# Patient Record
Sex: FEMALE | Race: WHITE
[De-identification: names, ages, dates, MRNs, and addresses within clinical notes are randomized per-mention and may not be internally consistent; named-entity substitution may affect disease eponyms.]

---

## 2019-06-10 LAB
ALBUMIN SERPL-MCNC: 3.6 G/DL (ref 3.4–5)
ANION GAP SERPL CALC-SCNC: 6 MMOL/L (ref 7–16)
BILIRUB UR-MCNC: NEGATIVE MG/DL
BUN SERPL-MCNC: 20 MG/DL (ref 7–18)
CALCIUM SERPL-MCNC: 9.2 MG/DL (ref 8.5–10.1)
CHLORIDE SERPL-SCNC: 103 MMOL/L (ref 98–107)
CO2 SERPL-SCNC: 29 MMOL/L (ref 21–32)
COLOR UR: YELLOW
CREAT SERPL-MCNC: 1.1 MG/DL (ref 0.6–1)
ERYTHROCYTE [DISTWIDTH] IN BLOOD BY AUTOMATED COUNT: 13.3 % (ref 10.5–14.5)
GLUCOSE SERPL-MCNC: 220 MG/DL (ref 74–106)
HCT VFR BLD CALC: 40.9 % (ref 37–47)
HGB BLD-MCNC: 14 GM/DL (ref 12–15)
INR PPP: 1.1
KETONES UR STRIP-MCNC: NEGATIVE MG/DL
MCH RBC QN AUTO: 29.9 PG (ref 26–34)
MCHC RBC AUTO-ENTMCNC: 34.2 G/DL (ref 28–37)
MCV RBC: 87.6 FL (ref 80–100)
PLATELET # BLD: 170 THOU/UL (ref 150–400)
POTASSIUM SERPL-SCNC: 5 MMOL/L (ref 3.5–5.1)
PROTHROMBIN TIME: 11.2 SECONDS (ref 9.3–11.4)
RBC # BLD AUTO: 4.66 MIL/UL (ref 4.2–5)
RBC # UR STRIP: NEGATIVE /UL
SODIUM SERPL-SCNC: 138 MMOL/L (ref 136–145)
SP GR UR STRIP: 1.02 (ref 1–1.03)
URINE CLARITY: CLEAR
URINE GLUCOSE-RANDOM*: (no result)
URINE LEUKOCYTES-REFLEX: NEGATIVE
URINE NITRITE-REFLEX: NEGATIVE
URINE PROTEIN (DIPSTICK): NEGATIVE
UROBILINOGEN UR STRIP-ACNC: 1 E.U./DL (ref 0.2–1)
WBC # BLD AUTO: 6.9 THOU/UL (ref 4–11)

## 2019-06-11 LAB
EST. AVERAGE GLUCOSE BLD GHB EST-MCNC: 171 MG/DL
GLYCOHEMOGLOBIN (HGB A1C): 7.6 % (ref 4.8–5.6)

## 2019-06-26 ENCOUNTER — HOSPITAL ENCOUNTER (INPATIENT)
Dept: HOSPITAL 35 - 4W | Age: 74
LOS: 2 days | Discharge: HOME HEALTH | DRG: 470 | End: 2019-06-28
Attending: ORTHOPAEDIC SURGERY | Admitting: ORTHOPAEDIC SURGERY
Payer: COMMERCIAL

## 2019-06-26 VITALS — DIASTOLIC BLOOD PRESSURE: 82 MMHG | SYSTOLIC BLOOD PRESSURE: 142 MMHG

## 2019-06-26 VITALS — SYSTOLIC BLOOD PRESSURE: 127 MMHG | DIASTOLIC BLOOD PRESSURE: 64 MMHG

## 2019-06-26 VITALS — SYSTOLIC BLOOD PRESSURE: 117 MMHG | DIASTOLIC BLOOD PRESSURE: 99 MMHG

## 2019-06-26 VITALS — SYSTOLIC BLOOD PRESSURE: 162 MMHG | DIASTOLIC BLOOD PRESSURE: 86 MMHG

## 2019-06-26 VITALS — HEIGHT: 64.02 IN | BODY MASS INDEX: 34.15 KG/M2 | WEIGHT: 200 LBS

## 2019-06-26 VITALS — SYSTOLIC BLOOD PRESSURE: 154 MMHG | DIASTOLIC BLOOD PRESSURE: 93 MMHG

## 2019-06-26 VITALS — DIASTOLIC BLOOD PRESSURE: 82 MMHG | SYSTOLIC BLOOD PRESSURE: 146 MMHG

## 2019-06-26 VITALS — SYSTOLIC BLOOD PRESSURE: 117 MMHG | DIASTOLIC BLOOD PRESSURE: 100 MMHG

## 2019-06-26 VITALS — DIASTOLIC BLOOD PRESSURE: 76 MMHG | SYSTOLIC BLOOD PRESSURE: 136 MMHG

## 2019-06-26 DIAGNOSIS — E78.5: ICD-10-CM

## 2019-06-26 DIAGNOSIS — M17.12: Primary | ICD-10-CM

## 2019-06-26 DIAGNOSIS — Z88.2: ICD-10-CM

## 2019-06-26 DIAGNOSIS — E11.65: ICD-10-CM

## 2019-06-26 DIAGNOSIS — I10: ICD-10-CM

## 2019-06-26 DIAGNOSIS — Z79.899: ICD-10-CM

## 2019-06-26 DIAGNOSIS — Z88.6: ICD-10-CM

## 2019-06-26 PROCEDURE — 50101: CPT

## 2019-06-26 PROCEDURE — 57110 VAGNC COMPL RMVL VAG WALL: CPT

## 2019-06-26 PROCEDURE — 62110: CPT

## 2019-06-26 PROCEDURE — 50415: CPT

## 2019-06-26 PROCEDURE — 57095: CPT

## 2019-06-26 PROCEDURE — 57127: CPT

## 2019-06-26 PROCEDURE — 8E0Y0CZ ROBOTIC ASSISTED PROCEDURE OF LOWER EXTREMITY, OPEN APPROACH: ICD-10-PCS | Performed by: ORTHOPAEDIC SURGERY

## 2019-06-26 PROCEDURE — 0SRD0L9 REPLACEMENT OF LEFT KNEE JOINT WITH MEDIAL UNICONDYLAR SYNTHETIC SUBSTITUTE, CEMENTED, OPEN APPROACH: ICD-10-PCS | Performed by: ORTHOPAEDIC SURGERY

## 2019-06-26 PROCEDURE — 50010 RENAL EXPLORATION: CPT

## 2019-06-26 PROCEDURE — 53370: CPT

## 2019-06-26 PROCEDURE — 56527: CPT

## 2019-06-26 PROCEDURE — 54118: CPT

## 2019-06-26 PROCEDURE — 53078: CPT

## 2019-06-26 PROCEDURE — 62900: CPT

## 2019-06-26 PROCEDURE — 70005: CPT

## 2019-06-26 PROCEDURE — 50954: CPT

## 2019-06-26 PROCEDURE — 51130: CPT

## 2019-06-26 PROCEDURE — 57103: CPT

## 2019-06-26 PROCEDURE — 51225: CPT

## 2019-06-26 PROCEDURE — 56528: CPT

## 2019-06-26 PROCEDURE — 57180 TREAT VAGINAL BLEEDING: CPT

## 2019-06-26 PROCEDURE — 10047: CPT

## 2019-06-27 VITALS — DIASTOLIC BLOOD PRESSURE: 74 MMHG | SYSTOLIC BLOOD PRESSURE: 115 MMHG

## 2019-06-27 VITALS — DIASTOLIC BLOOD PRESSURE: 71 MMHG | SYSTOLIC BLOOD PRESSURE: 151 MMHG

## 2019-06-27 VITALS — SYSTOLIC BLOOD PRESSURE: 139 MMHG | DIASTOLIC BLOOD PRESSURE: 72 MMHG

## 2019-06-27 VITALS — SYSTOLIC BLOOD PRESSURE: 146 MMHG | DIASTOLIC BLOOD PRESSURE: 77 MMHG

## 2019-06-27 VITALS — DIASTOLIC BLOOD PRESSURE: 53 MMHG | SYSTOLIC BLOOD PRESSURE: 124 MMHG

## 2019-06-27 LAB
ERYTHROCYTE [DISTWIDTH] IN BLOOD BY AUTOMATED COUNT: 13.7 % (ref 10.5–14.5)
HCT VFR BLD CALC: 37.7 % (ref 37–47)
HGB BLD-MCNC: 13 GM/DL (ref 12–15)
MCH RBC QN AUTO: 30.5 PG (ref 26–34)
MCHC RBC AUTO-ENTMCNC: 34.5 G/DL (ref 28–37)
MCV RBC: 88.3 FL (ref 80–100)
PLATELET # BLD: 174 THOU/UL (ref 150–400)
RBC # BLD AUTO: 4.27 MIL/UL (ref 4.2–5)
WBC # BLD AUTO: 10.8 THOU/UL (ref 4–11)

## 2019-06-27 NOTE — NUR
PATIENT DOING WELL TODAY. HAVING MINIMAL PAIN. TOLERATING DIET. SAT UP IN
CHAIR FOR MOST OF THE DAY. ELEVATED BLOOD SUGAR THIS AM. DR. MORENO ROUNDED
AND DR. MERRILL CONSULTED FOR MEDICAL MANAGEMENT. BLOOD SUGAR NOW DOWN 
BEFORE SUPPER. PATIENT NOW ON SLIDING SCALE. VOIDING WITHOUT DIFFICULTY AND IN
ADEQUATE AMOUNTS. LT KNEE BENJAMIN DRESSING COMPRESSED. FALL PRECAUTIONS IN PLACE.
PATIENT ABLE TO MAKE NEEDS KNOWN.

## 2019-06-27 NOTE — NUR
ASSUMED CARE OF PT AT 2030HRS. PT IS AOX4 AND CALLS FOR HELP AS NEEDED. FALL
PRECAUTION IN PLACE. PT REPORTS SOME PAIN AND WAS TREATED BY PO PAIN MEDS. PT
IS VOIDING AND IS UP WITH ONE ASSIST. NO S/S OF ACUTE DISTRESS. SURGICAL
DRESSING IS INTACT. PT IS [place] AND READS LIPS. WILL CONTINUE TO MONITOR.

## 2019-06-27 NOTE — NUR
DISCHARGE PLANNING.  POST ACUTE RECOMMENDED AT DISCHARGE.  ANTICIPATED
DISCHARGE IS PLANNED FOR TOMORROW.  REFERRAL FAXED TO [facility]
[place] FOR POST ACUTE PLACEMENT.  CALL PLACED TO [facility] ADMISSIONS TO
NOTIFY OF REFERRAL AND PLANNED DISCHARGE DATE.  CARLINE TO REVIEW AND NOTIFY CM
ONCE COMPLETE.  FOLLOWING TO ASSIST.

## 2019-06-27 NOTE — NUR
PT ADMITTED RELATED TO LEFT UNICOMPARTMENTAL KNEE REPLACEMENT. CM REVIEWED
CHART AND SPOKE WITH CARE TEAM. CM MET PT AT BEDSIDE THIS DAY PT IS A&O X4. CM
ROLE INTRODUCED. PT IS HEARING INPAIRED BUT READS LIPS. SHE INDICATED THAT SHE
LIVES ALONE IN A HOUSE WITH 2 STEPS TO ENTER AND NO STEPS INSIDE. PT INDICATED
SHE HAD USED A CANE TO ASSIST WITH AMBULATION PTA. PT INDICATED SHE HAS A
FRIEND WHO WILL BE ABLE TO ASSIST AT TIMES UPON DC. PT HAD INITIALLY WANTED TO
GO SKILLED BUT IS NOW THINKING SHE WOULD LIKE TO GO HOME WITH HOME HEALTH. PT
WILL NEED A FWW ISSUED PTD. CM TO FOLLOW AS INDICATED WITH DC PLANNING. CM TO
FOLLO AS INDICATED WITH DC PLANNING.

## 2019-06-28 VITALS — DIASTOLIC BLOOD PRESSURE: 72 MMHG | SYSTOLIC BLOOD PRESSURE: 127 MMHG

## 2019-06-28 VITALS — DIASTOLIC BLOOD PRESSURE: 78 MMHG | SYSTOLIC BLOOD PRESSURE: 144 MMHG

## 2019-06-28 VITALS — SYSTOLIC BLOOD PRESSURE: 144 MMHG | DIASTOLIC BLOOD PRESSURE: 78 MMHG

## 2019-06-28 LAB
ERYTHROCYTE [DISTWIDTH] IN BLOOD BY AUTOMATED COUNT: 13.5 % (ref 10.5–14.5)
HCT VFR BLD CALC: 36.9 % (ref 37–47)
HGB BLD-MCNC: 12.7 GM/DL (ref 12–15)
MCH RBC QN AUTO: 30.2 PG (ref 26–34)
MCHC RBC AUTO-ENTMCNC: 34.3 G/DL (ref 28–37)
MCV RBC: 88 FL (ref 80–100)
PLATELET # BLD: 162 THOU/UL (ref 150–400)
RBC # BLD AUTO: 4.2 MIL/UL (ref 4.2–5)
WBC # BLD AUTO: 10.1 THOU/UL (ref 4–11)

## 2019-06-28 NOTE — NUR
ASSUMED CARE OF PT @1900 ON THIS DAY PT A&OX4. STATED GOAL IS TO GET MOVING
AND BETTER. UP UITH ASSITX1. BENJAMIN DERESSING ON LEFT KNEE INTACT. PAIN MEDS
GIVEN FOR MANAGEMENT. BED IN LOW POSITION AND CALL LIGHT WITHIN REACH WILL
CONTINUE TO MONITOR.

## 2019-06-28 NOTE — NUR
PATIENT WAS DISCHARGED TO GO HOME WITH HOME HEALTH.IV WAS TAKEN OUT, CATH
INTACT.PATIENT HAS WALKER, AND ALL OF HER PERSONAL BELONGINGS.PT'S PAIN IS
WELL CONTROLED AT THIS TIME.PATIENT WAS GIVEN DISCHARGED PAPERWORK WITH
EDUCATION ON NEW MEDICATION.PATIENT WA TAKEN DOWN TO CAR VIA W/C BY CNA/AND
FRIEND OF FAMILY.

## 2019-06-28 NOTE — NUR
PATIENT CARE WAS ASSUMED AT 0715.PATIENT IS ALERT AND ORIENTED X4.PATIENT HARD
OF HEARING, ANDIS ABLE TO UNDERSTAND WHAT IS BEING SAID WHEN SHE READS YOUR
LIPS.PATIENT IS ABLE TO AMBULATE WITH WALKER WITH X1 ASSIST.PT IS OKAY WITH
PATIENT GOING HOME WITH HOME HEALTH.PT HAS PAIN 8/10 THIS MORNING.PAIN MEDS
WERE GIVEN THIS MORNING BY NIGHT SHIFT NURSE, AND OTHER PAINS WERE GIVEN WITH
MORNING MEDS.PT HAS CALL LIGHT, PHONE, PERSONAL BELONGINGS WITHIN REACH.

## 2019-11-20 ENCOUNTER — HOSPITAL ENCOUNTER (OUTPATIENT)
Dept: HOSPITAL 35 - ULTRA | Age: 74
End: 2019-11-20
Attending: ORTHOPAEDIC SURGERY
Payer: COMMERCIAL

## 2019-11-20 DIAGNOSIS — M79.89: ICD-10-CM

## 2019-11-20 DIAGNOSIS — M79.605: Primary | ICD-10-CM
